# Patient Record
Sex: MALE | Race: WHITE | Employment: OTHER | URBAN - NONMETROPOLITAN AREA
[De-identification: names, ages, dates, MRNs, and addresses within clinical notes are randomized per-mention and may not be internally consistent; named-entity substitution may affect disease eponyms.]

---

## 2019-01-04 ENCOUNTER — TELEPHONE (OUTPATIENT)
Dept: FAMILY MEDICINE | Facility: OTHER | Age: 84
End: 2019-01-04

## 2019-01-04 ENCOUNTER — OFFICE VISIT (OUTPATIENT)
Dept: FAMILY MEDICINE | Facility: OTHER | Age: 84
End: 2019-01-04
Payer: MEDICARE

## 2019-01-04 VITALS
BODY MASS INDEX: 23.77 KG/M2 | HEIGHT: 66 IN | WEIGHT: 147.9 LBS | SYSTOLIC BLOOD PRESSURE: 124 MMHG | HEART RATE: 76 BPM | OXYGEN SATURATION: 94 % | RESPIRATION RATE: 16 BRPM | TEMPERATURE: 95.5 F | DIASTOLIC BLOOD PRESSURE: 60 MMHG

## 2019-01-04 DIAGNOSIS — F43.9 STRESS: ICD-10-CM

## 2019-01-04 DIAGNOSIS — I25.2 HISTORY OF MYOCARDIAL INFARCTION: ICD-10-CM

## 2019-01-04 DIAGNOSIS — E78.5 HYPERLIPIDEMIA LDL GOAL <160: ICD-10-CM

## 2019-01-04 DIAGNOSIS — I10 ESSENTIAL HYPERTENSION: Primary | ICD-10-CM

## 2019-01-04 LAB
ANION GAP SERPL CALCULATED.3IONS-SCNC: 8 MMOL/L (ref 3–14)
BASOPHILS # BLD AUTO: 0 10E9/L (ref 0–0.2)
BASOPHILS NFR BLD AUTO: 0.7 %
BUN SERPL-MCNC: 21 MG/DL (ref 7–30)
CALCIUM SERPL-MCNC: 9.2 MG/DL (ref 8.5–10.1)
CHLORIDE SERPL-SCNC: 107 MMOL/L (ref 94–109)
CHOLEST SERPL-MCNC: 121 MG/DL
CO2 SERPL-SCNC: 24 MMOL/L (ref 20–32)
CREAT SERPL-MCNC: 1.34 MG/DL (ref 0.66–1.25)
DIFFERENTIAL METHOD BLD: ABNORMAL
EOSINOPHIL # BLD AUTO: 0.3 10E9/L (ref 0–0.7)
EOSINOPHIL NFR BLD AUTO: 4.6 %
ERYTHROCYTE [DISTWIDTH] IN BLOOD BY AUTOMATED COUNT: 12.8 % (ref 10–15)
GFR SERPL CREATININE-BSD FRML MDRD: 48 ML/MIN/{1.73_M2}
GLUCOSE SERPL-MCNC: 100 MG/DL (ref 70–99)
HCT VFR BLD AUTO: 40.9 % (ref 40–53)
HDLC SERPL-MCNC: 61 MG/DL
HGB BLD-MCNC: 14.5 G/DL (ref 13.3–17.7)
LDLC SERPL CALC-MCNC: 37 MG/DL
LYMPHOCYTES # BLD AUTO: 1.2 10E9/L (ref 0.8–5.3)
LYMPHOCYTES NFR BLD AUTO: 20.1 %
MCH RBC QN AUTO: 34.4 PG (ref 26.5–33)
MCHC RBC AUTO-ENTMCNC: 35.5 G/DL (ref 31.5–36.5)
MCV RBC AUTO: 97 FL (ref 78–100)
MONOCYTES # BLD AUTO: 0.6 10E9/L (ref 0–1.3)
MONOCYTES NFR BLD AUTO: 9.8 %
NEUTROPHILS # BLD AUTO: 4 10E9/L (ref 1.6–8.3)
NEUTROPHILS NFR BLD AUTO: 64.8 %
NONHDLC SERPL-MCNC: 60 MG/DL
PLATELET # BLD AUTO: 153 10E9/L (ref 150–450)
POTASSIUM SERPL-SCNC: 4.4 MMOL/L (ref 3.4–5.3)
RBC # BLD AUTO: 4.22 10E12/L (ref 4.4–5.9)
SODIUM SERPL-SCNC: 139 MMOL/L (ref 133–144)
TRIGL SERPL-MCNC: 115 MG/DL
WBC # BLD AUTO: 6.1 10E9/L (ref 4–11)

## 2019-01-04 PROCEDURE — 80048 BASIC METABOLIC PNL TOTAL CA: CPT | Performed by: PHYSICIAN ASSISTANT

## 2019-01-04 PROCEDURE — 99204 OFFICE O/P NEW MOD 45 MIN: CPT | Performed by: PHYSICIAN ASSISTANT

## 2019-01-04 PROCEDURE — 85025 COMPLETE CBC W/AUTO DIFF WBC: CPT | Performed by: PHYSICIAN ASSISTANT

## 2019-01-04 PROCEDURE — 36415 COLL VENOUS BLD VENIPUNCTURE: CPT | Performed by: PHYSICIAN ASSISTANT

## 2019-01-04 PROCEDURE — 80061 LIPID PANEL: CPT | Performed by: PHYSICIAN ASSISTANT

## 2019-01-04 RX ORDER — COLESEVELAM 180 1/1
TABLET ORAL
COMMUNITY
End: 2019-09-12

## 2019-01-04 RX ORDER — NEBIVOLOL 10 MG/1
TABLET ORAL
COMMUNITY
End: 2019-09-12

## 2019-01-04 RX ORDER — ASPIRIN 81 MG/1
81 TABLET, CHEWABLE ORAL DAILY
COMMUNITY

## 2019-01-04 RX ORDER — SPIRONOLACTONE 25 MG/1
TABLET ORAL
COMMUNITY
End: 2019-09-12

## 2019-01-04 RX ORDER — LOSARTAN POTASSIUM 100 MG/1
TABLET ORAL
COMMUNITY
End: 2019-09-12

## 2019-01-04 RX ORDER — EZETIMIBE 10 MG/1
10 TABLET ORAL DAILY
COMMUNITY
End: 2019-01-04

## 2019-01-04 RX ORDER — EZETIMIBE 10 MG/1
10 TABLET ORAL DAILY
Qty: 90 TABLET | Refills: 1 | Status: SHIPPED | OUTPATIENT
Start: 2019-01-04 | End: 2019-09-12

## 2019-01-04 RX ORDER — ATORVASTATIN CALCIUM 80 MG/1
TABLET, FILM COATED ORAL
COMMUNITY
End: 2019-09-12

## 2019-01-04 ASSESSMENT — MIFFLIN-ST. JEOR: SCORE: 1300.21

## 2019-01-04 ASSESSMENT — PAIN SCALES - GENERAL: PAINLEVEL: NO PAIN (0)

## 2019-01-04 NOTE — NURSING NOTE
Health Maintenance Due   Topic Date Due     PHQ-2 Q1 YR  10/11/1945     DTAP/TDAP/TD IMMUNIZATION (1 - Tdap) 10/11/1958     ZOSTER IMMUNIZATION (1 of 2) 10/11/1983     ADVANCE DIRECTIVE PLANNING Q5 YRS  10/11/1988     FALL RISK ASSESSMENT  10/11/1998     PNEUMOVAX IMMUNIZATION 65+ LOW/MEDIUM RISK (1 of 2 - PCV13) 10/11/1998     INFLUENZA VACCINE (1) 09/01/2018     Kayli ANTHONY LPN

## 2019-01-04 NOTE — PROGRESS NOTES
SUBJECTIVE:   George Madera is a 85 year old male who presents to clinic today for the following health issues:      New Patient/Transfer of Care    Patient is an 85 year old male who presents to the clinic to establish care. He recently moved the  area from [place] following the loss of his wife. He has a half-brother who sold him a home here, but he says that he is quite displeased with the state of the [place] Patient talked at length about the events leading up to the passing of his wife. It sounded as if she complained of a hip pain and a provider recommended surgery. However, the surgery did not provide the relief as expected. Per the patient's report the surgery was repeated two additional times. By the end of the third procedure his wife required additional cares and began staying at a nursing home. Due to financial difficulties she was transferred to a different residence at which she had passed. Patient denies thoughts of wanting to hurt self or others and is not interested in counseling at this time. 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 Patient required redirection to discuss health concerns. He brought a list of his medications and dosing. Patient has a history of myocardial infarction 19 years ago. No other health concerns that he reported at this time. Recommended checking labs and establishing patient with cardiologist here.     Problem list and histories reviewed & adjusted, as indicated.  Additional history: as documented    Reviewed and updated as needed this visit by clinical staff  Tobacco  Allergies  Meds  Med Hx  Surg Hx  Fam Hx  Soc Hx      Reviewed and updated as needed this visit by Provider         ROS:  Constitutional, HEENT, cardiovascular,  "pulmonary, gi and gu systems are negative, except as otherwise noted.    OBJECTIVE:     /60 (BP Location: Left arm, Patient Position: Chair, Cuff Size: Adult Large)   Pulse 76   Temp 95.5  F (35.3  C) (Oral)   Resp 16   Ht 1.679 m (5' 6.1\")   Wt 67.1 kg (147 lb 14.4 oz)   SpO2 94%   BMI 23.80 kg/m    Body mass index is 23.8 kg/m .  GENERAL: healthy, alert and no distress  EYES: Eyes grossly normal to inspection, PERRL and conjunctivae and sclerae normal  HENT: ear canals and TM's normal, nose and mouth without ulcers or lesions  RESP: lungs clear to auscultation - no rales, rhonchi or wheezes  CV: regular rate and rhythm, normal S1 S2, no S3 or S4, no murmur, click or rub, no peripheral edema and peripheral pulses strong  ABDOMEN: soft, nontender, no hepatosplenomegaly, no masses and bowel sounds normal  MS: heberden nodes on 2nd/3rd fingers with medial angulation of the distal digits bilaterally, no edema  NEURO: Normal strength and tone, mentation intact and speech normal  PSYCH: mentation appears normal, affect normal/bright    Diagnostic Test Results:  Results for orders placed or performed in visit on 01/04/19   Lipid Profile   Result Value Ref Range    Cholesterol 121 <200 mg/dL    Triglycerides 115 <150 mg/dL    HDL Cholesterol 61 >39 mg/dL    LDL Cholesterol Calculated 37 <100 mg/dL    Non HDL Cholesterol 60 <130 mg/dL   Basic metabolic panel  (Ca, Cl, CO2, Creat, Gluc, K, Na, BUN)   Result Value Ref Range    Sodium 139 133 - 144 mmol/L    Potassium 4.4 3.4 - 5.3 mmol/L    Chloride 107 94 - 109 mmol/L    Carbon Dioxide 24 20 - 32 mmol/L    Anion Gap 8 3 - 14 mmol/L    Glucose 100 (H) 70 - 99 mg/dL    Urea Nitrogen 21 7 - 30 mg/dL    Creatinine 1.34 (H) 0.66 - 1.25 mg/dL    GFR Estimate 48 (L) >60 mL/min/[1.73_m2]    GFR Estimate If Black 56 (L) >60 mL/min/[1.73_m2]    Calcium 9.2 8.5 - 10.1 mg/dL   CBC with platelets and differential   Result Value Ref Range    WBC 6.1 4.0 - 11.0 10e9/L    RBC " Count 4.22 (L) 4.4 - 5.9 10e12/L    Hemoglobin 14.5 13.3 - 17.7 g/dL    Hematocrit 40.9 40.0 - 53.0 %    MCV 97 78 - 100 fl    MCH 34.4 (H) 26.5 - 33.0 pg    MCHC 35.5 31.5 - 36.5 g/dL    RDW 12.8 10.0 - 15.0 %    Platelet Count 153 150 - 450 10e9/L    % Neutrophils 64.8 %    % Lymphocytes 20.1 %    % Monocytes 9.8 %    % Eosinophils 4.6 %    % Basophils 0.7 %    Absolute Neutrophil 4.0 1.6 - 8.3 10e9/L    Absolute Lymphocytes 1.2 0.8 - 5.3 10e9/L    Absolute Monocytes 0.6 0.0 - 1.3 10e9/L    Absolute Eosinophils 0.3 0.0 - 0.7 10e9/L    Absolute Basophils 0.0 0.0 - 0.2 10e9/L    Diff Method Automated Method        ASSESSMENT/PLAN:   1. Essential hypertension  /60 today, this is excellent. Patient denies adverse effects from medication. We checked labs today and patient was notified of the results. No changes recommended to medications at this time.   - Basic metabolic panel  (Ca, Cl, CO2, Creat, Gluc, K, Na, BUN)  - CBC with platelets and differential  - CARDIOLOGY EVAL ADULT REFERRAL    2. Hyperlipidemia LDL goal <160  Refill sent to the pharmacy. Lipids returned within normal range. Encouraged patient to exercise as tolerated and continue healthy diet.   - ezetimibe (ZETIA) 10 MG tablet; Take 1 tablet (10 mg) by mouth daily  Dispense: 90 tablet; Refill: 1  - Lipid Profile  - CARDIOLOGY EVAL ADULT REFERRAL    3. Stress  Patient reports several stressors in his life including loss of wife, move to new area, financial stressors and limited social network. No current reports of depression or SI. Continue to monitor at future appointments.     4. History of myocardial infarction  Patient will establish with cardiologist to discuss current medication regimen.   - CARDIOLOGY EVAL ADULT REFERRAL    Follow up with clinic in 6 months or sooner if conditions change, worsen or fail to improve as expected.      Rory Sauceda PA-C  [facility]

## 2019-01-04 NOTE — LETTER
January 4, 2019      George Madera  [address] [place] [address]        Dear ,    We are writing to inform you of your test results.    cholesterol levels returned all within normal range.The kidney function showed a mild elevation in creatinine and reduction in the filtration rate. The cell counts were grossly within normal range. Very mild decrease in total RBC count, but no signs of anemia or infection. I do not recommend changes in the medications at this time. Consider discussing digoxin adjustment with cardiologist based on filtration rate    Resulted Orders   Lipid Profile   Result Value Ref Range    Cholesterol 121 <200 mg/dL    Triglycerides 115 <150 mg/dL    HDL Cholesterol 61 >39 mg/dL    LDL Cholesterol Calculated 37 <100 mg/dL      Comment:      Desirable:       <100 mg/dl    Non HDL Cholesterol 60 <130 mg/dL   Basic metabolic panel  (Ca, Cl, CO2, Creat, Gluc, K, Na, BUN)   Result Value Ref Range    Sodium 139 133 - 144 mmol/L    Potassium 4.4 3.4 - 5.3 mmol/L    Chloride 107 94 - 109 mmol/L    Carbon Dioxide 24 20 - 32 mmol/L    Anion Gap 8 3 - 14 mmol/L    Glucose 100 (H) 70 - 99 mg/dL    Urea Nitrogen 21 7 - 30 mg/dL    Creatinine 1.34 (H) 0.66 - 1.25 mg/dL    GFR Estimate 48 (L) >60 mL/min/[1.73_m2]      Comment:      Non  GFR Calc  Starting 12/18/2018, serum creatinine based estimated GFR (eGFR) will be   calculated using the Chronic Kidney Disease Epidemiology Collaboration   (CKD-EPI) equation.      GFR Estimate If Black 56 (L) >60 mL/min/[1.73_m2]      Comment:       GFR Calc  Starting 12/18/2018, serum creatinine based estimated GFR (eGFR) will be   calculated using the Chronic Kidney Disease Epidemiology Collaboration   (CKD-EPI) equation.      Calcium 9.2 8.5 - 10.1 mg/dL   CBC with platelets and differential   Result Value Ref Range    WBC 6.1 4.0 - 11.0 10e9/L    RBC Count 4.22 (L) 4.4 - 5.9 10e12/L    Hemoglobin 14.5 13.3 - 17.7 g/dL     Hematocrit 40.9 40.0 - 53.0 %    MCV 97 78 - 100 fl    MCH 34.4 (H) 26.5 - 33.0 pg    MCHC 35.5 31.5 - 36.5 g/dL    RDW 12.8 10.0 - 15.0 %    Platelet Count 153 150 - 450 10e9/L    % Neutrophils 64.8 %    % Lymphocytes 20.1 %    % Monocytes 9.8 %    % Eosinophils 4.6 %    % Basophils 0.7 %    Absolute Neutrophil 4.0 1.6 - 8.3 10e9/L    Absolute Lymphocytes 1.2 0.8 - 5.3 10e9/L    Absolute Monocytes 0.6 0.0 - 1.3 10e9/L    Absolute Eosinophils 0.3 0.0 - 0.7 10e9/L    Absolute Basophils 0.0 0.0 - 0.2 10e9/L    Diff Method Automated Method        If you have any questions or concerns, please call the clinic at the number listed above.       Sincerely,        Rory Sauceda PA-C

## 2019-01-04 NOTE — PATIENT INSTRUCTIONS
Today we checked:  Complete blood cell count - red/blood cells, platelets  Basic metabolic panel - kidney function and electrolyte levels  Lipid profile - cholesterol levels

## 2019-09-09 ENCOUNTER — OFFICE VISIT (OUTPATIENT)
Dept: FAMILY MEDICINE | Facility: OTHER | Age: 84
End: 2019-09-09
Payer: MEDICARE

## 2019-09-09 ENCOUNTER — ANCILLARY PROCEDURE (OUTPATIENT)
Dept: GENERAL RADIOLOGY | Facility: OTHER | Age: 84
End: 2019-09-09
Attending: FAMILY MEDICINE
Payer: MEDICARE

## 2019-09-09 VITALS
DIASTOLIC BLOOD PRESSURE: 58 MMHG | RESPIRATION RATE: 16 BRPM | TEMPERATURE: 96.9 F | WEIGHT: 140.13 LBS | BODY MASS INDEX: 22.55 KG/M2 | SYSTOLIC BLOOD PRESSURE: 124 MMHG | HEART RATE: 72 BPM | OXYGEN SATURATION: 96 %

## 2019-09-09 DIAGNOSIS — R59.0 CERVICAL LYMPHADENOPATHY: ICD-10-CM

## 2019-09-09 DIAGNOSIS — R59.0 CERVICAL LYMPHADENOPATHY: Primary | ICD-10-CM

## 2019-09-09 LAB
ANION GAP SERPL CALCULATED.3IONS-SCNC: 5 MMOL/L (ref 3–14)
BASOPHILS # BLD AUTO: 0.1 10E9/L (ref 0–0.2)
BASOPHILS NFR BLD AUTO: 0.9 %
BUN SERPL-MCNC: 20 MG/DL (ref 7–30)
CALCIUM SERPL-MCNC: 8.8 MG/DL (ref 8.5–10.1)
CHLORIDE SERPL-SCNC: 104 MMOL/L (ref 94–109)
CO2 SERPL-SCNC: 27 MMOL/L (ref 20–32)
CREAT SERPL-MCNC: 1.13 MG/DL (ref 0.66–1.25)
DIFFERENTIAL METHOD BLD: ABNORMAL
EOSINOPHIL # BLD AUTO: 0.4 10E9/L (ref 0–0.7)
EOSINOPHIL NFR BLD AUTO: 7.9 %
ERYTHROCYTE [DISTWIDTH] IN BLOOD BY AUTOMATED COUNT: 12.9 % (ref 10–15)
GFR SERPL CREATININE-BSD FRML MDRD: 59 ML/MIN/{1.73_M2}
GLUCOSE SERPL-MCNC: 93 MG/DL (ref 70–99)
HCT VFR BLD AUTO: 39 % (ref 40–53)
HGB BLD-MCNC: 13.3 G/DL (ref 13.3–17.7)
LYMPHOCYTES # BLD AUTO: 1.3 10E9/L (ref 0.8–5.3)
LYMPHOCYTES NFR BLD AUTO: 24.2 %
MCH RBC QN AUTO: 34.1 PG (ref 26.5–33)
MCHC RBC AUTO-ENTMCNC: 34.1 G/DL (ref 31.5–36.5)
MCV RBC AUTO: 100 FL (ref 78–100)
MONOCYTES # BLD AUTO: 0.6 10E9/L (ref 0–1.3)
MONOCYTES NFR BLD AUTO: 10.1 %
NEUTROPHILS # BLD AUTO: 3.1 10E9/L (ref 1.6–8.3)
NEUTROPHILS NFR BLD AUTO: 56.9 %
PLATELET # BLD AUTO: 168 10E9/L (ref 150–450)
POTASSIUM SERPL-SCNC: 4.4 MMOL/L (ref 3.4–5.3)
RBC # BLD AUTO: 3.9 10E12/L (ref 4.4–5.9)
SODIUM SERPL-SCNC: 136 MMOL/L (ref 133–144)
WBC # BLD AUTO: 5.4 10E9/L (ref 4–11)

## 2019-09-09 PROCEDURE — 71046 X-RAY EXAM CHEST 2 VIEWS: CPT | Mod: FY

## 2019-09-09 PROCEDURE — 80048 BASIC METABOLIC PNL TOTAL CA: CPT | Performed by: FAMILY MEDICINE

## 2019-09-09 PROCEDURE — 85025 COMPLETE CBC W/AUTO DIFF WBC: CPT | Performed by: FAMILY MEDICINE

## 2019-09-09 PROCEDURE — 99214 OFFICE O/P EST MOD 30 MIN: CPT | Performed by: FAMILY MEDICINE

## 2019-09-09 PROCEDURE — 36415 COLL VENOUS BLD VENIPUNCTURE: CPT | Performed by: FAMILY MEDICINE

## 2019-09-09 ASSESSMENT — PAIN SCALES - GENERAL: PAINLEVEL: WORST PAIN (10)

## 2019-09-09 NOTE — PROGRESS NOTES
Subjective     George Madera is a 85 year old male who presents to clinic today for the following health issues:    HPI   Neck  Pain       Duration: Little over a week         Specific cause: none    Description:   Location of pain: neck left, swollen, feels like an electrical shock.   Character of pain: sharp and stabbing  Pain radiation:none  New numbness or weakness in legs, not attributed to pain:  no     Intensity: Currently 0/10, At its worst 10/10    History:   Pain interferes with job: Not applicable  History of back problems: no prior back problems  Any previous MRI or X-rays: None  Sees a specialist for back pain:  No  Therapies tried without relief: none    Alleviating factors:   Improved by: none      Precipitating factors:  Worsened by: Nothing    Concern: Hasn't taking medications due to running out.  He has distant history of MI. Last seen in clinic 8 months ago to establish PCP    George Madera is a 85 year old male who presents to clinic today for the following health issues:        New Patient/Transfer of Care     Patient is an 85 year old male who presents to the clinic to establish care. He recently moved the  area from [place] following the loss of his wife. He has a half-brother who sold him a home here, but he says that he is quite displeased with the state of the [place] Patient talked at length about the events leading up to the passing of his wife. It sounded as if she complained of a hip pain and a provider recommended surgery. However, the surgery did not provide the relief as expected. Per the patient's report the surgery was repeated two additional times. By the end of the third procedure his wife required additional cares and began staying at a nursing home. Due to financial difficulties she was transferred to a different residence at which she had passed. Patient denies thoughts of wanting to hurt self or others and is not  interested in counseling at this time. 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 Patient required redirection to discuss health concerns. He brought a list of his medications and dosing. Patient has a history of myocardial infarction 19 years ago. No other health concerns that he reported at this time. Recommended checking labs and establishing patient with cardiologist here.                There is no problem list on file for this patient.    History reviewed. No pertinent surgical history.    Social History     Tobacco Use     Smoking status: Former Smoker     Smokeless tobacco: Never Used   Substance Use Topics     Alcohol use: Yes     Comment: occasional     History reviewed. No pertinent family history.      Current Outpatient Medications   Medication Sig Dispense Refill     aspirin (ASPIRIN 81) 81 MG chewable tablet Take 81 mg by mouth daily       atorvastatin (LIPITOR) 80 MG tablet 1 tablet daily       DIGOXIN PO 0.125 mg daily       ezetimibe (ZETIA) 10 MG tablet Take 1 tablet (10 mg) by mouth daily 90 tablet 1     losartan (COZAAR) 100 MG tablet 1 tablet daily       nebivolol (BYSTOLIC) 10 MG tablet 1/2 tablet daily       Omega-3 Fatty Acids (FISH OIL PO) 660 mg 3 capsules daily       spironolactone (ALDACTONE) 25 MG tablet 1/2 tablet daily       colesevelam (WELCHOL) 625 MG tablet 4 capsules daily       No Known Allergies  Recent Labs   Lab Test 01/04/19  0843   LDL 37   HDL 61   TRIG 115   CR 1.34*   GFRESTIMATED 48*   GFRESTBLACK 56*   POTASSIUM 4.4      BP Readings from Last 3 Encounters:   09/09/19 124/58   01/04/19 124/60    Wt Readings from Last 3 Encounters:   09/09/19 63.6 kg (140 lb 2 oz)   01/04/19 67.1 kg (147 lb 14.4 oz)                      Reviewed and updated as needed this visit by Provider  Tobacco  Allergies   Meds  Problems  Med Hx  Surg Hx  Fam Hx         Review of Systems   ROS COMP: CONSTITUTIONAL:POSITIVE  for weight loss and NEGATIVE  for anorexia, chills, fatigue, malaise and sweats  ENT/MOUTH: POSITIVE for swollen glands and NEGATIVE for fever, hoarse voice, hoarseness, sore throat and tooth pain  RESP: NEGATIVE for significant cough or SOB  CV: NEGATIVE for chest pain, palpitations or peripheral edema  HEME/ALLERGY/IMMUNE: POSITIVE  for swollen nodes and NEGATIVE for chills, fever and night sweats  ROS otherwise negative      Objective    /58 (BP Location: Left arm, Patient Position: Chair, Cuff Size: Adult Regular)   Pulse 72   Temp 96.9  F (36.1  C) (Temporal)   Resp 16   Wt 63.6 kg (140 lb 2 oz)   SpO2 96%   BMI 22.55 kg/m    Body mass index is 22.55 kg/m .  Physical Exam   GENERAL: healthy, alert and no distress  NECK: cervical adenopathy left upper anterior lymph node, 2x3 cm, firm, nontender, no  scars, thyroid normal to palpation, trachea midline and normal to palpation and no carotid bruits  RESP: lungs clear to auscultation - no rales, rhonchi or wheezes  CV: regular rate and rhythm, normal S1 S2, no S3 or S4, no murmur, click or rub, no peripheral edema and peripheral pulses strong  ABDOMEN: soft, nontender, no hepatosplenomegaly, no masses and bowel sounds normal  MS: no gross musculoskeletal defects noted, no edema  LYMPH: anterior cervical: enlarged lymph nodes in the left anterior cervical area.  posterior cervical: no adenopathy  supraclavicular: no adenopathy  axillary: no adenopathy  epitrochliar: no adenopathy  inguinal: no adenopathy  No hepato-splenomegaly    Diagnostic Test Results:  Labs reviewed in Epic  Results for orders placed or performed in visit on 09/09/19 (from the past 24 hour(s))   CBC with platelets differential   Result Value Ref Range    WBC 5.4 4.0 - 11.0 10e9/L    RBC Count 3.90 (L) 4.4 - 5.9 10e12/L    Hemoglobin 13.3 13.3 - 17.7 g/dL    Hematocrit 39.0 (L)  40.0 - 53.0 %     78 - 100 fl    MCH 34.1 (H) 26.5 - 33.0 pg    MCHC 34.1 31.5 - 36.5 g/dL    RDW 12.9 10.0 - 15.0 %    Platelet Count 168 150 - 450 10e9/L    % Neutrophils 56.9 %    % Lymphocytes 24.2 %    % Monocytes 10.1 %    % Eosinophils 7.9 %    % Basophils 0.9 %    Absolute Neutrophil 3.1 1.6 - 8.3 10e9/L    Absolute Lymphocytes 1.3 0.8 - 5.3 10e9/L    Absolute Monocytes 0.6 0.0 - 1.3 10e9/L    Absolute Eosinophils 0.4 0.0 - 0.7 10e9/L    Absolute Basophils 0.1 0.0 - 0.2 10e9/L    Diff Method Automated Method      CXR - Dual pacemaker, no acute infiltrate. No mediastinal adenopathy or mass.        Assessment & Plan     1. Cervical lymphadenopathy  George Madera is a 85 year old male who presents with 1 week history of enlarged left superior anterior cervical neck mass. Non mobile, firm and non-tender. Normal CBC. Reactive lymph adenopathy unlikely. Will obtain US soft tissue of neck with possible CT imagining to characterize. Will most likely need excisional biopsy with ENT or general surgery.    - CBC with platelets differential  - XR Chest 2 Views; Future  - Basic metabolic panel  - US Thyroid with Soft Tissue Head and Neck; Future       Patient seen in clinic 8 months ago to establish care from [place].  Referred to Cardiology to establish care but did not follow up. He will need refills of several medications but he is unsure which. He is also unsure the last time his pacemaker battery was checked. He appears to be doing well from with out signs of HF at this time. He will request refills of medication from pharmacy be sent to us. I would like him to follow up in 2 weeks for review of all chronic medications and consider follow up with Cardiology. May need DEAN for clinic in [place].    Return in about 2 weeks (around 9/23/2019) for Recheck pain medication.    Allen Blank MD  [facility]

## 2019-09-12 DIAGNOSIS — E78.5 HYPERLIPIDEMIA LDL GOAL <160: ICD-10-CM

## 2019-09-12 DIAGNOSIS — I25.2 HISTORY OF MYOCARDIAL INFARCTION: Primary | ICD-10-CM

## 2019-09-12 RX ORDER — EZETIMIBE 10 MG/1
10 TABLET ORAL DAILY
Qty: 90 TABLET | Refills: 1 | Status: SHIPPED | OUTPATIENT
Start: 2019-09-12

## 2019-09-12 RX ORDER — COLESEVELAM 180 1/1
2500 TABLET ORAL
Qty: 120 TABLET | Refills: 0 | Status: SHIPPED | OUTPATIENT
Start: 2019-09-12

## 2019-09-12 RX ORDER — LOSARTAN POTASSIUM 100 MG/1
100 TABLET ORAL DAILY
Qty: 30 TABLET | Refills: 0 | Status: SHIPPED | OUTPATIENT
Start: 2019-09-12

## 2019-09-12 RX ORDER — NEBIVOLOL 5 MG/1
5 TABLET ORAL DAILY
Qty: 30 TABLET | Refills: 0 | Status: SHIPPED | OUTPATIENT
Start: 2019-09-12

## 2019-09-12 RX ORDER — DIGOXIN 125 MCG
125 TABLET ORAL DAILY
Qty: 30 TABLET | Refills: 0 | OUTPATIENT
Start: 2019-09-12

## 2019-09-12 RX ORDER — SPIRONOLACTONE 25 MG/1
12.5 TABLET ORAL DAILY
Qty: 15 TABLET | Refills: 0 | Status: SHIPPED | OUTPATIENT
Start: 2019-09-12

## 2019-09-12 RX ORDER — DIGOXIN 125 MCG
125 TABLET ORAL DAILY
Qty: 30 TABLET | Refills: 0 | Status: SHIPPED | OUTPATIENT
Start: 2019-09-12

## 2019-09-12 RX ORDER — ATORVASTATIN CALCIUM 80 MG/1
80 TABLET, FILM COATED ORAL DAILY
Qty: 30 TABLET | Refills: 0 | Status: SHIPPED | OUTPATIENT
Start: 2019-09-12

## 2019-09-12 NOTE — TELEPHONE ENCOUNTER
Patient presents to the clinic with all of his medications bottles. He said he saw Dr. Blank on Monday and was advised to bring in his medications for refills. RN sorted through them all and T'd up as his bottles read. He is scheduled for his follow up as well.     Will route to Dr. Blank to review.     SPENCER Brown, RN  [facility]

## 2019-10-13 ENCOUNTER — TRANSFERRED RECORDS (OUTPATIENT)
Dept: HEALTH INFORMATION MANAGEMENT | Facility: CLINIC | Age: 84
End: 2019-10-13

## 2019-10-13 LAB
ALT SERPL-CCNC: 13 U/L (ref 8–45)
AST SERPL-CCNC: 16 U/L (ref 5–41)
CREAT SERPL-MCNC: 1.19 MG/DL (ref 0.72–1.25)
GFR SERPL CREATININE-BSD FRML MDRD: 55 ML/MIN/1.73M2
GLUCOSE SERPL-MCNC: 92 MG/DL (ref 70–100)
INR PPP: 1.4 (ref 0.9–1.1)
POTASSIUM SERPL-SCNC: 4.3 MMOL/L (ref 3.5–5.1)
TSH SERPL-ACNC: 5.64 UIU/ML (ref 0.47–5)

## 2019-10-18 ENCOUNTER — TELEPHONE (OUTPATIENT)
Dept: FAMILY MEDICINE | Facility: OTHER | Age: 84
End: 2019-10-18

## 2019-10-18 NOTE — TELEPHONE ENCOUNTER
Patient being discharged today. Will postpone message to Monday and follow up with patient then.     ALYSON BrownN, RN  [facility]

## 2019-10-18 NOTE — TELEPHONE ENCOUNTER
Patient called to schedule an appointment for a hospital follow-up or appeared on a report showing that they were recently discharged from the hospital.    Patient was admitted to [facility]:  10/13/19  Discharged date: 10/18/19  Reason for hospital admission:  Confussion  Does patient have future appointment scheduled with provider? Yes DB  Date of future appointment:  10/24/19      This information will be used to help the care team plan for the patients upcoming visit.  The triage RN may determine that a follow up call is necessary and reach out to the patient via the phone number listed in the chart.     Please route this message on routine priority to the Triage RN pool.

## 2019-10-21 NOTE — TELEPHONE ENCOUNTER
ED / Discharge Outreach Protocol    Patient Contact    Attempt # 1    Was call answered?  No.  Unable to leave message.  not set up.     ALYSON BrownN, RN  [facility]

## 2019-10-22 NOTE — TELEPHONE ENCOUNTER
ED / Discharge Outreach Protocol    Patient Contact    Attempt # 2    Was call answered?  No.  Unable to leave message. No VM set up.     ALYSON BrownN, RN  [facility]

## 2019-10-23 NOTE — TELEPHONE ENCOUNTER
ED / Discharge Outreach Protocol    Patient Contact    Attempt # 3    Was call answered?  No.  Unable to leave message. No VM set up.     ALYSON BrownN, RN  [facility]

## 2019-11-12 ENCOUNTER — TRANSFERRED RECORDS (OUTPATIENT)
Dept: HEALTH INFORMATION MANAGEMENT | Facility: CLINIC | Age: 84
End: 2019-11-12